# Patient Record
(demographics unavailable — no encounter records)

---

## 2025-05-28 NOTE — PHYSICAL EXAM
[Normal Appearance] : normal appearance [Well Groomed] : well groomed [General Appearance - In No Acute Distress] : no acute distress [Edema] : no peripheral edema [Respiration, Rhythm And Depth] : normal respiratory rhythm and effort [Exaggerated Use Of Accessory Muscles For Inspiration] : no accessory muscle use [Abdomen Soft] : soft [Abdomen Tenderness] : non-tender [Costovertebral Angle Tenderness] : no ~M costovertebral angle tenderness [Urinary Bladder Findings] : the bladder was normal on palpation [Normal Station and Gait] : the gait and station were normal for the patient's age [] : no rash [No Focal Deficits] : no focal deficits [Oriented To Time, Place, And Person] : oriented to person, place, and time [Affect] : the affect was normal [Mood] : the mood was normal [No Palpable Adenopathy] : no palpable adenopathy [Chaperone Present] : A chaperone was present in the examining room during all aspects of the physical examination [FreeTextEntry2] : bph

## 2025-07-11 NOTE — DISCUSSION/SUMMARY
[Antithrombotic therapy with ___] : antithrombotic therapy with  [unfilled] [Intensive Blood Pressure Control] : intensive blood pressure control [Lipid Lowering Therapy] : lipid lowering therapy [Patient encouraged to discuss with Primary MD] : I encouraged the patient to discuss these important issues with ~his/her~ primary care doctor [Goals and Counseling] : I have reviewed the goals of stroke risk factor modification. I counseled the patient on measures to reduce stroke risk, including the importance of medication compliance, risk factor control, exercise, healthy diet and avoidance of smoking. I reviewed stroke warning signs and symptoms and appropriate actions to take if such occur. [FreeTextEntry1] : Summary. He presented with AMS for 2 days that started on 7/21/2022 with speech disturbance particularly moderate/ severe mixed aphasia with CT head showing L frontal intraparenchymal hemorrhage potentially from amyloid angiopathy.. Also was found to be COVID +   Impression: Moderate-severe mixed aphasia (fluency more impaired than comprehension) due to a left frontal lobar hemorrhage, probably due to amyloid angiopathy.  Repeat MRI, MRA was negative for underlying pathology   Overall patient is neurologically stable. Avoid any antiplatelets or therapeutic anticoagulation due to increased risk of intracranial hemorrhage from possible CAA unless absolutely indicated. Patient and family members were also advised to avoid any medications with potential bleeding complications like NSAIDs, fish oil supplements  Transcranial and Carotid Doppler's to be obtained at next appointment.  5/19/2023.  His neurologic exam shows a mild-moderate Broca type aphasia which has significantly improved over time; mild loss of manual dexterity bilaterally of doubtful clinical significance, and he is otherwise neurologically intact.  He has had some forgetfulness during his daily activities which may not be fully explained by aphasia, and may reflect other types of cognitive impairment, perhaps related to his previous ICH or perhaps neurodegenerative.  I understand that he will be evaluated by Dr. Santoyo for this.  Repeat CT head (1/19/2023) showed encephalomalacia in the left frontal region with essentially complete resolution of the hemorrhage.  He continues to take aspirin, which I would assume is for coronary artery disease and/or CHF.  Although he has had a lobar hemorrhage, presumably due to amyloid angiopathy, most likely the benefits of aspirin will outweigh the risks.  He has daytime fatigue and occasional snoring, raising the possibility of sleep apnea.  I have requested a home sleep study.  12/4/23 - Overall he is neurologically stable and probably very  gradually  improving in terms of fluency - Carotid Doppler done today demonstrated asymptomatic, moderate right ICA stenosis, for which there is no role for revascularization. An irregular heart rate and thyroid nodules were incidentally detected; I will defer to your judgement regarding whether these incidental findings warrant further investigation.  - He should continue to benefit from regular speech therapy. - He should continue to benefit from aggressive vascular risk factor control, with emphasis on BP control. - He continues to endorse daytime fatigue and occasional snoring, raising the possibility of sleep apnea.  I have again requested a home sleep study.  9/9/24 - Overall he is neurologically stable with probably gradually improving Broca aphasia . - Dopplers done today were overall stable from prior exam, with moderate right ICA stenosis and the interval development of mild left ICA plaque, of no clinical significance. - He should continue to benefit from regular speech therapy. - He should continue to benefit from aggressive vascular risk factor control, with emphasis on BP control. - He continues to endorse daytime fatigue and occasional snoring, raising the possibility of sleep apnea. I have again requested a home sleep study.  7/11/2025. He has had some gradual deterioration in his language function, including word finding difficulty, as well as perhaps more general memory disturbances by history, and some motor perseveration on exam.  Suspect that he has a mild neurodegenerative process such as Alzheimer's disease, superimposed on his aphasia attributable to his previous ICH.   I suggested that he follow-up with Dr. Santoyo for further evaluation and treatment of possible Alzheimer's disease.  I gave him a prescription to resume speech therapy "1 on 1" at [facility] as requested by his wife.  I did not have the chance to request another home sleep study but he may benefit from this at some point.  He can follow-up In about 6 months with Dopplers.  I hope that he remains free of further serious trouble.

## 2025-07-11 NOTE — PHYSICAL EXAM
[General Appearance - Alert] : alert [General Appearance - In No Acute Distress] : in no acute distress [Oriented To Time, Place, And Person] : oriented to person, place, and time [Impaired Insight] : insight and judgment were intact [Affect] : the affect was normal [Sclera] : the sclera and conjunctiva were normal [PERRL With Normal Accommodation] : pupils were equal in size, round, reactive to light, with normal accommodation [Extraocular Movements] : extraocular movements were intact [Outer Ear] : the ears and nose were normal in appearance [Oropharynx] : the oropharynx was normal [Neck Appearance] : the appearance of the neck was normal [Neck Cervical Mass (___cm)] : no neck mass was observed [Jugular Venous Distention Increased] : there was no jugular-venous distention [Thyroid Diffuse Enlargement] : the thyroid was not enlarged [Thyroid Nodule] : there were no palpable thyroid nodules [Auscultation Breath Sounds / Voice Sounds] : lungs were clear to auscultation bilaterally [Heart Rate And Rhythm] : heart rate was normal and rhythm regular [Heart Sounds] : normal S1 and S2 [Heart Sounds Gallop] : no gallops [Murmurs] : no murmurs [Heart Sounds Pericardial Friction Rub] : no pericardial rub [Arterial Pulses Carotid] : carotid pulses were normal with no bruits [Edema] : there was no peripheral edema [Veins - Varicosity Changes] : there were no varicosital changes [No CVA Tenderness] : no ~M costovertebral angle tenderness [No Spinal Tenderness] : no spinal tenderness [Abnormal Walk] : normal gait [Nail Clubbing] : no clubbing  or cyanosis of the fingernails [Musculoskeletal - Swelling] : no joint swelling seen [Motor Tone] : muscle strength and tone were normal [Skin Color & Pigmentation] : normal skin color and pigmentation [Skin Turgor] : normal skin turgor [] : no rash [FreeTextEntry1] : Generally looked well. Mental status exam: Alert, oriented x3, speech was moderately disfluent, hesitant, with moderately decreased content and with frequent circumlocutions; followed crossed command, But on 1 occasion there was marked motor perseverations; repetition moderate-severely impaired;  memory and fund of knowledge were not formally tested.  On cranial nerve exam, I was unable to see the fundi; slight flattening of the right nasolabial fold; the remainder of cranial nerves II through XII were intact. On motor exam tone was normal. There was no drift. Fine finger movements were mildly slowed bilaterally. Power was normal throughout. Reflexes were 2+-3+ throughout except the Achilles reflexes which were 2+ and plantar reflexes were downgoing. Coordination and gait were normal. Tandem gait was normal. Romberg test was negative. Sensory exam was intact to all modalities.

## 2025-07-11 NOTE — CONSULT LETTER
[Dear  ___] : Dear  [unfilled], [Consult Letter:] : I had the pleasure of evaluating your patient, [unfilled]. [Please see my note below.] : Please see my note below. [Consult Closing:] : Thank you very much for allowing me to participate in the care of this patient.  If you have any questions, please do not hesitate to contact me. [Sincerely,] : Sincerely, [DrJessica  ___] : Dr. ACE [FreeTextEntry2] : Julio C Rick MD [FreeTextEntry3] : Richard B. Libman, MD, FRCPC \par  , Neurology \par  Co-Director, Stroke Center\par  Professor of Neurology\par  [facility] [employer] at [facility]\par

## 2025-07-11 NOTE — DISCUSSION/SUMMARY
[Antithrombotic therapy with ___] : antithrombotic therapy with  [unfilled] [Intensive Blood Pressure Control] : intensive blood pressure control [Lipid Lowering Therapy] : lipid lowering therapy [Patient encouraged to discuss with Primary MD] : I encouraged the patient to discuss these important issues with ~his/her~ primary care doctor [Goals and Counseling] : I have reviewed the goals of stroke risk factor modification. I counseled the patient on measures to reduce stroke risk, including the importance of medication compliance, risk factor control, exercise, healthy diet and avoidance of smoking. I reviewed stroke warning signs and symptoms and appropriate actions to take if such occur. [FreeTextEntry1] : Summary. He presented with AMS for 2 days that started on 7/21/2022 with speech disturbance particularly moderate/ severe mixed aphasia with CT head showing L frontal intraparenchymal hemorrhage potentially from amyloid angiopathy.. Also was found to be COVID +   Impression: Moderate-severe mixed aphasia (fluency more impaired than comprehension) due to a left frontal lobar hemorrhage, probably due to amyloid angiopathy.  Repeat MRI, MRA was negative for underlying pathology   Overall patient is neurologically stable. Avoid any antiplatelets or therapeutic anticoagulation due to increased risk of intracranial hemorrhage from possible CAA unless absolutely indicated. Patient and family members were also advised to avoid any medications with potential bleeding complications like NSAIDs, fish oil supplements  Transcranial and Carotid Doppler's to be obtained at next appointment.  5/19/2023.  His neurologic exam shows a mild-moderate Broca type aphasia which has significantly improved over time; mild loss of manual dexterity bilaterally of doubtful clinical significance, and he is otherwise neurologically intact.  He has had some forgetfulness during his daily activities which may not be fully explained by aphasia, and may reflect other types of cognitive impairment, perhaps related to his previous ICH or perhaps neurodegenerative.  I understand that he will be evaluated by Dr. Santoyo for this.  Repeat CT head (1/19/2023) showed encephalomalacia in the left frontal region with essentially complete resolution of the hemorrhage.  He continues to take aspirin, which I would assume is for coronary artery disease and/or CHF.  Although he has had a lobar hemorrhage, presumably due to amyloid angiopathy, most likely the benefits of aspirin will outweigh the risks.  He has daytime fatigue and occasional snoring, raising the possibility of sleep apnea.  I have requested a home sleep study.  12/4/23 - Overall he is neurologically stable and probably very  gradually  improving in terms of fluency - Carotid Doppler done today demonstrated asymptomatic, moderate right ICA stenosis, for which there is no role for revascularization. An irregular heart rate and thyroid nodules were incidentally detected; I will defer to your judgement regarding whether these incidental findings warrant further investigation.  - He should continue to benefit from regular speech therapy. - He should continue to benefit from aggressive vascular risk factor control, with emphasis on BP control. - He continues to endorse daytime fatigue and occasional snoring, raising the possibility of sleep apnea.  I have again requested a home sleep study.  9/9/24 - Overall he is neurologically stable with probably gradually improving Broca aphasia . - Dopplers done today were overall stable from prior exam, with moderate right ICA stenosis and the interval development of mild left ICA plaque, of no clinical significance. - He should continue to benefit from regular speech therapy. - He should continue to benefit from aggressive vascular risk factor control, with emphasis on BP control. - He continues to endorse daytime fatigue and occasional snoring, raising the possibility of sleep apnea. I have again requested a home sleep study.  7/11/2025. He has had some gradual deterioration in his language function, including word finding difficulty, as well as perhaps more general memory disturbances by history, and some motor perseveration on exam.  Suspect that he has a mild neurodegenerative process such as Alzheimer's disease, superimposed on his aphasia attributable to his previous ICH.   I suggested that he follow-up with Dr. Santoyo for further evaluation and treatment of possible Alzheimer's disease.  I gave him a prescription to resume speech therapy "1 on 1" at [facility] as requested by his wife.  I did not have the chance to request another home sleep study but he may benefit from this at some point.  He can follow-up In about 6 months with Dopplers.  I hope that he remains free of further serious trouble.

## 2025-07-11 NOTE — REVIEW OF SYSTEMS
[Feeling Tired] : feeling tired [As Noted in HPI] : as noted in HPI [Negative] : Heme/Lymph [FreeTextEntry2] : Occasional snoring

## 2025-07-11 NOTE — CONSULT LETTER
[Dear  ___] : Dear  [unfilled], [Consult Letter:] : I had the pleasure of evaluating your patient, [unfilled]. [Please see my note below.] : Please see my note below. [Consult Closing:] : Thank you very much for allowing me to participate in the care of this patient.  If you have any questions, please do not hesitate to contact me. [Sincerely,] : Sincerely, [DrJessica  ___] : Dr. ACE [FreeTextEntry2] : Julio C Rick MD [FreeTextEntry3] : Richard B. Libman, MD, FRCPC \par  , Neurology \par  Co-Director, Stroke Center\par  Professor of Neurology\par  [facility] [employer] at [facility]\par

## 2025-07-11 NOTE — HISTORY OF PRESENT ILLNESS
[FreeTextEntry1] : He is an 81 year old gentleman. He has a PMHx  aortic valve insufficiency, PVC, CAD, AAA, DM, BPH, HLD   who had a  left frontal lobar hemorrhage on 7/23/2022  HPI: He presented with AMS for 2 days that started on 7/21/2022 with speech disturbance particularly moderate/ severe mixed aphasia found with CT head L frontal intraparenchymal hemorrhage potentially from amyloid angiopathy. A1c 6.6 and LDL 47. MRI was deferred due to COVID and would likely not change immediate management. Repeat CT was done which was stable. TTE: EF43%, mild/mod MR, Mild to mod global LV systolic dysfunction. Also found to be COVID + and started on remdesivir ..   MRI head  9/1/22 that revealed no vessel pathology and evolution of L anterior frontal lobe hemorrhage. He reports feeling well and is in OT and speech. His memory is affected but does not affect his ADLs.   11/21/2022 Today he reports feeling well. He is compliant with meds. He remains in speech and notes improvement.  5/19/2023.  He came to the office today accompanied by his wife.  His wife has noted gradual improvement in his speech generation.  At times, he seems "forgetful" in ways which may not be readily explainable by aphasia.  12/4/23. He came to the office today accompanied by his wife. His wife has noted gradual improvement in his speech generation. [facility] He denies any new focal neurologic symptoms.  Carotid Doppler 12/4/23: There is moderate right ICA stenosis, approximately 40-60%, 50% by diameter reduction, There is no hemodynamically significant stenosis of the left ICA. Heart rate was noted to be irregular. Incidental finding: Right thyroid nodule: 0.7 x 0.5cm. Left thyroid nodule: 0.4 x 0.4cm.  9/9/24 He presents to the office today with his wife. His wife has noted improvements in his memory. He denies any new focal neurologic symptoms.  Carotid Doppler 9/9/24: There is moderate right ICA stenosis, approximately 40-60%, 50% by diameter reduction, There is mild, <40%, left ICA stenosis.  Heart rate was noted to be irregular. Incidental finding: Right thyroid nodule: 0.5 x 0.4cm. Left thyroid nodule: 0.4 x 0.3cm. Impression: There has been the interval development of minimal plaque of the left ICA, since prior exam in 12/23. TCD 9/9/24: Normal.  7/11/2025. He came to the office today accompanied by his wife.  She reports that his word finding difficulty, comprehension and general memory function have gradually deteriorated.  Occasionally, he is irritable.  Repeat carotid Doppler (7/11/2025): Right-moderate ulcerated calcified plaque with 40-60% stenosis by velocity criteria, 50% diameter reduction.  Left-normal. Incidental finding: Irregular heart rate. Impression.  No significant change versus 9/24 except that we actually did not detect even mild plaque on the left on the current exam.  Repeat transcranial Doppler (7/11/2025):Normal, although velocities were low throughout. Incidental finding: Irregular heart rate. Impression.  No significant change versus 9/24 except diffusely low velocities detected on the current exam, perhaps reflecting decreased cardiac output.